# Patient Record
Sex: FEMALE | Race: WHITE | URBAN - METROPOLITAN AREA
[De-identification: names, ages, dates, MRNs, and addresses within clinical notes are randomized per-mention and may not be internally consistent; named-entity substitution may affect disease eponyms.]

---

## 2024-08-22 ENCOUNTER — OFFICE VISIT (OUTPATIENT)
Dept: URGENT CARE | Age: 19
End: 2024-08-22

## 2024-08-22 VITALS
HEART RATE: 122 BPM | DIASTOLIC BLOOD PRESSURE: 75 MMHG | TEMPERATURE: 98 F | HEIGHT: 62 IN | OXYGEN SATURATION: 96 % | BODY MASS INDEX: 17.11 KG/M2 | SYSTOLIC BLOOD PRESSURE: 112 MMHG | WEIGHT: 93 LBS

## 2024-08-22 DIAGNOSIS — J02.0 STREP PHARYNGITIS: ICD-10-CM

## 2024-08-22 DIAGNOSIS — J02.9 SORE THROAT: Primary | ICD-10-CM

## 2024-08-22 LAB
Lab: NORMAL
QC PASS/FAIL: NORMAL
SARS-COV-2 RDRP RESP QL NAA+PROBE: NEGATIVE
STREPTOCOCCUS A RNA: ABNORMAL

## 2024-08-22 RX ORDER — AMOXICILLIN 500 MG/1
500 CAPSULE ORAL 2 TIMES DAILY
Qty: 20 CAPSULE | Refills: 0 | Status: SHIPPED | OUTPATIENT
Start: 2024-08-22 | End: 2024-09-01

## 2024-08-22 ASSESSMENT — ENCOUNTER SYMPTOMS
NAUSEA: 1
SORE THROAT: 1
EYE PAIN: 0
EYE DISCHARGE: 0
SHORTNESS OF BREATH: 0
COUGH: 1
VOMITING: 1
ABDOMINAL PAIN: 0
EYE REDNESS: 0
DIARRHEA: 0

## 2024-08-22 NOTE — PROGRESS NOTES
Minna Callaway (: 2005) is a 19 y.o. female, New patient, here for evaluation of the following chief complaint(s):  Pharyngitis and Otalgia (2 days ago woke up with sore throat, nausea, yesterday began to get sinus congestion, felt a little short of breath.  Now ears feel full. )      ASSESSMENT/PLAN:    ICD-10-CM    1. Sore throat  J02.9 POCT Rapid Strep A DNA (Alere i)     POCT COVID-19 Rapid, NAAT      2. Strep pharyngitis  J02.0 amoxicillin (AMOXIL) 500 MG capsule          - Pt just wanted a rapid strep and covid test done. Pt is positive for strep. Pt is negative for covid. Low concern for deep neck infection, ludwigs angina, peritonsillar abscess, otitis media, bacterial pneumonia, bronchitis, sinusitis or sepsis.  - Pt to drink lots of fluids  - Pt to take medication as prescribed  - Pt ok to take tylenol and ibuprofen as needed  - Pt to call if any symptoms worsen or follow up with PCP if not better in 7 days  - Pt to go to ER if have shortness of breath, chest pain, sudden fever or lethargy  - Pt to isolate until on antibiotic for 24 hours  - Change out tooth brush    Discussed PCP follow up for persisting or worsening symptoms, or to return to the clinic if unable to obtain PCP follow up for worsening symptoms.    The patient tolerated their visit well. The patient and/or the family were informed of the results of any tests, a time was given to answer questions, a plan was proposed and they agreed with plan. Reviewed AVS with treatment instructions and answered questions - pt/family expresses understanding and agreement with the discussed treatment plan and AVS instructions.      SUBJECTIVE/OBJECTIVE:  HPI:   19 y.o. female presents for complaint of pt states she started 2 days ago with a sore throat.     Admits nausea, vomited once 2 days ago, nasal congestion and mild cough.   Denies fever, body aches, headache, abdominal pain or diarrhea.      Has taken benadryl at home. No known

## 2024-10-13 ENCOUNTER — OFFICE VISIT (OUTPATIENT)
Age: 19
End: 2024-10-13

## 2024-10-13 VITALS
HEIGHT: 62 IN | WEIGHT: 90 LBS | SYSTOLIC BLOOD PRESSURE: 118 MMHG | BODY MASS INDEX: 16.56 KG/M2 | DIASTOLIC BLOOD PRESSURE: 71 MMHG | HEART RATE: 127 BPM | OXYGEN SATURATION: 97 %

## 2024-10-13 DIAGNOSIS — R52 PAIN: Primary | ICD-10-CM

## 2024-10-13 DIAGNOSIS — S66.911A STRAIN OF RIGHT WRIST, INITIAL ENCOUNTER: ICD-10-CM

## 2024-10-13 NOTE — PROGRESS NOTES
Minna Bustillos Cesia (:  2005) is a 19 y.o. female,Established patient, here for evaluation of the following chief complaint(s):  Wrist Pain (Right wrist pain medially)      ASSESSMENT/PLAN:    ICD-10-CM    1. Pain  R52 XR WRIST RIGHT (MIN 3 VIEWS)      2. Strain of right wrist, initial encounter  S66.911A [facility] Thumb Spica, Right     [facility]        Radiologist report pending....   my preliminary report: (-) for fracture or dislocation      ========================================================================  FINDINGS:  Negative for fracture or dislocation.  Negative for osseous erosions.  Negative for joint effusion.  Small osseous excrescence at the radial aspect  of the distal radius at the physeal scar which is a normal finding.  IMPRESSION:  Negative for fracture.   ========================================================================    This past Wednesday  Can't say she hurt it at work..was holding dog, nothing unusual while doing this .... afterwards / later on....noticed  right  wrist  discomfort ..starting hurting    Notices discomfort  if pushing up off of something,  it grabbing and holding  things to long. no numbness .weakness or  tingling felt in hand/fingers       SUBJECTIVE/OBJECTIVE:  Patient presents with:  Wrist Pain: Right wrist pain medially     This past wednesday  Can't say she hurt it at work..was holding dog, nothing unusual while doing this .... afterwards / later on....noticed  right  wrist  discomfort ..starting hurting    Notices discomfort  if pushing up off of something,  it grabbing and holding  things to long. no numbness .weakness or  tingling felt in hand/fingers      History provided by:  Patient   used: No        Vitals:    10/13/24 1445   BP: 118/71   Site: Left Upper Arm   Position: Sitting   Cuff Size: Medium Adult   Pulse: (!) 127   SpO2: 97%   Weight: 40.8 kg (90 lb)

## 2024-10-13 NOTE — PATIENT INSTRUCTIONS
limited activities as discussed..   take tylenol/ibuprofen (unless has contraindications)  as needed for pain. ....wear  wrist splint     for support and comfort..  Patient  to call orthopedic MD  to schedule follow up appointment  if not better in 7-10 days

## 2024-10-22 SDOH — HEALTH STABILITY: PHYSICAL HEALTH
ON AVERAGE, HOW MANY DAYS PER WEEK DO YOU ENGAGE IN MODERATE TO STRENUOUS EXERCISE (LIKE A BRISK WALK)?: PATIENT DECLINED

## 2024-10-22 SDOH — HEALTH STABILITY: PHYSICAL HEALTH: ON AVERAGE, HOW MANY MINUTES DO YOU ENGAGE IN EXERCISE AT THIS LEVEL?: PATIENT DECLINED

## 2024-10-23 ENCOUNTER — OFFICE VISIT (OUTPATIENT)
Dept: ORTHOPEDIC SURGERY | Age: 19
End: 2024-10-23
Payer: COMMERCIAL

## 2024-10-23 VITALS — WEIGHT: 93 LBS | BODY MASS INDEX: 17.11 KG/M2 | HEIGHT: 62 IN

## 2024-10-23 DIAGNOSIS — M25.531 RIGHT WRIST PAIN: Primary | ICD-10-CM

## 2024-10-23 PROCEDURE — 99203 OFFICE O/P NEW LOW 30 MIN: CPT | Performed by: ORTHOPAEDIC SURGERY

## 2024-10-23 NOTE — PROGRESS NOTES
CHIEF COMPLAINT: Right wrist pain    History:   Ms. Callaway 19 y.o. right handed female presents today for the first visit for evaluation of a right wrist pain.    The patient was referred by James Amado MD. This is evaluated as a personal injury.   The pain began 2 weeks ago.  She rates pain at 2/10.  There was not a history of injury.  She states that when she was going from work she noticed some discomfort in her wrist.  She points to pain being located along the volar aspect along the distal forearm and along the ulnar aspect of her hand.  Pain has improved since 2 weeks ago.  Symptoms were worse with typing and lifting things.  She denied any pain with pronation or supination no pain with finger or wrist flexion or extension.  She was given a thumb spica brace which actually made her symptoms worse.  The patient has not taken NSAIDs. The patient has not used ice or heat.   She denies numbness or tingling in her hand.  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      No past medical history on file.    No past surgical history on file.    No current outpatient medications on file prior to visit.     No current facility-administered medications on file prior to visit.       No Known Allergies    Social History     Socioeconomic History    Marital status:      Spouse name: Not on file    Number of children: Not on file    Years of education: Not on file    Highest education level: Not on file   Occupational History    Not on file   Tobacco Use    Smoking status: Unknown    Smokeless tobacco: Not on file   Substance and Sexual Activity    Alcohol use: Not on file    Drug use: Not on file    Sexual activity: Not on file   Other Topics Concern    Not on file   Social History Narrative    Not on file     Social Determinants of Health     Financial Resource Strain: Not on file   Food Insecurity: Unknown (1/22/2024)    [facility]